# Patient Record
Sex: FEMALE | Race: WHITE
[De-identification: names, ages, dates, MRNs, and addresses within clinical notes are randomized per-mention and may not be internally consistent; named-entity substitution may affect disease eponyms.]

---

## 2018-06-10 ENCOUNTER — HOSPITAL ENCOUNTER (INPATIENT)
Dept: HOSPITAL 72 - EMR | Age: 83
LOS: 3 days | Discharge: HOME | DRG: 311 | End: 2018-06-13
Payer: COMMERCIAL

## 2018-06-10 VITALS — DIASTOLIC BLOOD PRESSURE: 73 MMHG | SYSTOLIC BLOOD PRESSURE: 150 MMHG

## 2018-06-10 VITALS — DIASTOLIC BLOOD PRESSURE: 69 MMHG | SYSTOLIC BLOOD PRESSURE: 141 MMHG

## 2018-06-10 VITALS — HEIGHT: 64 IN | BODY MASS INDEX: 24.41 KG/M2 | WEIGHT: 143 LBS

## 2018-06-10 VITALS — DIASTOLIC BLOOD PRESSURE: 59 MMHG | SYSTOLIC BLOOD PRESSURE: 122 MMHG

## 2018-06-10 VITALS — SYSTOLIC BLOOD PRESSURE: 152 MMHG | DIASTOLIC BLOOD PRESSURE: 65 MMHG

## 2018-06-10 VITALS — SYSTOLIC BLOOD PRESSURE: 129 MMHG | DIASTOLIC BLOOD PRESSURE: 98 MMHG

## 2018-06-10 VITALS — DIASTOLIC BLOOD PRESSURE: 61 MMHG | SYSTOLIC BLOOD PRESSURE: 126 MMHG

## 2018-06-10 DIAGNOSIS — F43.9: ICD-10-CM

## 2018-06-10 DIAGNOSIS — Z88.0: ICD-10-CM

## 2018-06-10 DIAGNOSIS — I20.9: Primary | ICD-10-CM

## 2018-06-10 DIAGNOSIS — E87.1: ICD-10-CM

## 2018-06-10 DIAGNOSIS — F41.9: ICD-10-CM

## 2018-06-10 DIAGNOSIS — F32.9: ICD-10-CM

## 2018-06-10 LAB
ADD MANUAL DIFF: NO
ALBUMIN SERPL-MCNC: 3.8 G/DL (ref 3.4–5)
ALBUMIN/GLOB SERPL: 1.2 {RATIO} (ref 1–2.7)
ALP SERPL-CCNC: 90 U/L (ref 46–116)
ALT SERPL-CCNC: 25 U/L (ref 12–78)
ANION GAP SERPL CALC-SCNC: 7 MMOL/L (ref 5–15)
APPEARANCE UR: CLEAR
APTT PPP: YELLOW S
AST SERPL-CCNC: 16 U/L (ref 15–37)
BASOPHILS NFR BLD AUTO: 0.8 % (ref 0–2)
BILIRUB SERPL-MCNC: 0.2 MG/DL (ref 0.2–1)
BUN SERPL-MCNC: 16 MG/DL (ref 7–18)
CALCIUM SERPL-MCNC: 9.3 MG/DL (ref 8.5–10.1)
CHLORIDE SERPL-SCNC: 96 MMOL/L (ref 98–107)
CK SERPL-CCNC: 178 U/L (ref 26–308)
CO2 SERPL-SCNC: 28 MMOL/L (ref 21–32)
CREAT SERPL-MCNC: 1.1 MG/DL (ref 0.55–1.3)
EOSINOPHIL NFR BLD AUTO: 1 % (ref 0–3)
ERYTHROCYTE [DISTWIDTH] IN BLOOD BY AUTOMATED COUNT: 11.3 % (ref 11.6–14.8)
GLOBULIN SER-MCNC: 3.1 G/DL
GLUCOSE UR STRIP-MCNC: NEGATIVE MG/DL
HCT VFR BLD CALC: 36 % (ref 37–47)
HGB BLD-MCNC: 12.4 G/DL (ref 12–16)
KETONES UR QL STRIP: NEGATIVE
LEUKOCYTE ESTERASE UR QL STRIP: NEGATIVE
LYMPHOCYTES NFR BLD AUTO: 23 % (ref 20–45)
MCV RBC AUTO: 94 FL (ref 80–99)
MONOCYTES NFR BLD AUTO: 6.4 % (ref 1–10)
NEUTROPHILS NFR BLD AUTO: 68.9 % (ref 45–75)
NITRITE UR QL STRIP: NEGATIVE
PH UR STRIP: 8 [PH] (ref 4.5–8)
PLATELET # BLD: 227 K/UL (ref 150–450)
POTASSIUM SERPL-SCNC: 4.1 MMOL/L (ref 3.5–5.1)
PROT UR QL STRIP: NEGATIVE
RBC # BLD AUTO: 3.84 M/UL (ref 4.2–5.4)
SODIUM SERPL-SCNC: 131 MMOL/L (ref 136–145)
SP GR UR STRIP: 1.01 (ref 1–1.03)
UROBILINOGEN UR-MCNC: NORMAL MG/DL (ref 0–1)
WBC # BLD AUTO: 10.8 K/UL (ref 4.8–10.8)

## 2018-06-10 PROCEDURE — 82550 ASSAY OF CK (CPK): CPT

## 2018-06-10 PROCEDURE — 71045 X-RAY EXAM CHEST 1 VIEW: CPT

## 2018-06-10 PROCEDURE — 85025 COMPLETE CBC W/AUTO DIFF WBC: CPT

## 2018-06-10 PROCEDURE — 99285 EMERGENCY DEPT VISIT HI MDM: CPT

## 2018-06-10 PROCEDURE — 81003 URINALYSIS AUTO W/O SCOPE: CPT

## 2018-06-10 PROCEDURE — 80053 COMPREHEN METABOLIC PANEL: CPT

## 2018-06-10 PROCEDURE — 36415 COLL VENOUS BLD VENIPUNCTURE: CPT

## 2018-06-10 PROCEDURE — 80329 ANALGESICS NON-OPIOID 1 OR 2: CPT

## 2018-06-10 PROCEDURE — 80307 DRUG TEST PRSMV CHEM ANLYZR: CPT

## 2018-06-10 PROCEDURE — 93005 ELECTROCARDIOGRAM TRACING: CPT

## 2018-06-10 PROCEDURE — 84484 ASSAY OF TROPONIN QUANT: CPT

## 2018-06-10 PROCEDURE — 78451 HT MUSCLE IMAGE SPECT SING: CPT

## 2018-06-10 PROCEDURE — 84443 ASSAY THYROID STIM HORMONE: CPT

## 2018-06-10 PROCEDURE — 85651 RBC SED RATE NONAUTOMATED: CPT

## 2018-06-10 RX ADMIN — HEPARIN SODIUM SCH UNITS: 5000 INJECTION INTRAVENOUS; SUBCUTANEOUS at 23:01

## 2018-06-10 NOTE — EMERGENCY ROOM REPORT
History of Present Illness


General


Chief Complaint:  General Complaint


Source:  Patient





Present Illness


HPI


She presents with increased anxiety, chest pressure, nausea and diarrhea.  She 

just started Lexapro 4 days ago.  She is concerned there might be a reaction to 

Lexapro.  No vomiting.  Has taken Peptobismol.  Loose stools which have been 

yellow in color.  She feels dizzy when standing or walking.  Denies 

palpitations.  





Increased anxiety due to senior care, ending relationship with  

boyfriend.  Denies SI or HI.  Feels she cannot control her anxiety with 

difficulty concentrating.





H/O nervous breakdown related to use of Valium requiring several days of 

hospitalization several years ago.  She states she had "water intoxication" a 

that time.  She cannot tolerate any benzodiazepines.





She was recently seen by her MD and told that a lab test suggested "inflammation

".  Denies recent cardiac work up.





Denies diabetes, smoking, HTN.





She does not know if thyroid normal.  





No rashes, URI, productive cough, wheezing.


Allergies:  


Coded Allergies:  


     PENICILLINS (Verified  Allergy, Unknown, 6/10/18)





Patient History


Past Medical History:  see triage record


Social History:  Denies: smoking, alcohol use, drug use


Social History Narrative


recently retired


Last Menstrual Period:  na


Reviewed Nursing Documentation:  PMH: Agreed; PSxH: Agreed





Nursing Documentation-PMH


Past Medical History:  No History, Except For


History Of Psychiatric Problem:  Yes - depression, anxiety





Review of Systems


All Other Systems:  negative except mentioned in HPI





Physical Exam





Vital Signs








  Date Time  Temp Pulse Resp B/P (MAP) Pulse Ox O2 Delivery O2 Flow Rate FiO2


 


6/10/18 18:14 97.9 83 20 144/86 94 Room Air  





 97.9       








Sp02 EP Interpretation:  reviewed, abnormal - interpreted as slightly low by me


General Appearance:  well appearing, GCS 15, mild distress


Head:  normocephalic, atraumatic


Eyes:  bilateral eye normal inspection, bilateral eye PERRL


ENT:  moist mucus membranes


Neck:  supple


Respiratory:  chest non-tender, lungs clear, normal breath sounds


Cardiovascular #1:  regular rate, rhythm


Cardiovascular #2:  2+ radial (R)


Gastrointestinal:  normal inspection, normal bowel sounds, non tender, no mass, 

non-distended


Musculoskeletal:  back normal, gait/station normal, normal range of motion


Neurologic:  alert, oriented x3, CNs III-XII nml as tested, motor strength/tone 

normal, DTRs symmetric, sensory intact, cerebellar normal, normal gait, speech 

normal


Psychiatric:  no suicidal/homicidal ideation, anxious


Skin:  normal inspection, warm/dry





Medical Decision Making


Diagnostic Impression:  


 Primary Impression:  


 Chest pain


 Qualified Codes:  R07.9 - Chest pain, unspecified


 Additional Impressions:  


 Adverse reaction to lexapro


 Anxiety and depression


ER Course


Patient presents with chest pressure, dizziness, anxiety nausea and diarrhea.  

DDX: anxiety, electrolyte imbalance, adverse reaction to Lexapro, thyroid 

abnormalities, ACS amongst others.  Evaluation with EKG, CXR, labs.  Treatment 

initially suggested with anxiolytic refused.  Zofran and pepcid ordered.  

Cardiac monitoring.





EKG no injury, CXR no infiltrates.  Labs significant for normal WBC, min low H/H

,  min low sodium, min elevated glucose, normal troponin and TSH.





Refused antihistamines - (I suggested Atarax).





Improved nausea with zofran and pepcid, but continued chest pressure.  Anxiety 

also improved.





Due to possibility of atypical ischemia, patient admitted for repeated troponin 

testing and cardiac monitoring.  Consideration for cardiology consultation as 

well as psychiatric consultation.





I suggested consideration of Paxil to patient.





Laboratory Tests








Test


  6/10/18


19:17


 


White Blood Count


  10.8 K/UL


(4.8-10.8)


 


Red Blood Count


  3.84 M/UL


(4.20-5.40)  L


 


Hemoglobin


  12.4 G/DL


(12.0-16.0)


 


Hematocrit


  36.0 %


(37.0-47.0)  L


 


Mean Corpuscular Volume 94 FL (80-99)  


 


Mean Corpuscular Hemoglobin


  32.4 PG


(27.0-31.0)  H


 


Mean Corpuscular Hemoglobin


Concent 34.6 G/DL


(32.0-36.0)


 


Red Cell Distribution Width


  11.3 %


(11.6-14.8)  L


 


Platelet Count


  227 K/UL


(150-450)


 


Mean Platelet Volume


  5.5 FL


(6.5-10.1)  L


 


Neutrophils (%) (Auto)


  68.9 %


(45.0-75.0)


 


Lymphocytes (%) (Auto)


  23.0 %


(20.0-45.0)


 


Monocytes (%) (Auto)


  6.4 %


(1.0-10.0)


 


Eosinophils (%) (Auto)


  1.0 %


(0.0-3.0)


 


Basophils (%) (Auto)


  0.8 %


(0.0-2.0)


 


Urine Color Yellow  


 


Urine Appearance Clear  


 


Urine pH 8.0 (4.5-8.0)  


 


Urine Specific Gravity


  1.010


(1.005-1.035)


 


Urine Protein


  Negative


(NEGATIVE)


 


Urine Glucose (UA)


  Negative


(NEGATIVE)


 


Urine Ketones


  Negative


(NEGATIVE)


 


Urine Occult Blood


  2+ (NEGATIVE)


H


 


Urine Nitrite


  Negative


(NEGATIVE)


 


Urine Bilirubin


  Negative


(NEGATIVE)


 


Urine Urobilinogen


  Normal MG/DL


(0.0-1.0)


 


Urine Leukocyte Esterase


  Negative


(NEGATIVE)


 


Urine RBC


  10-15 /HPF (0


- 2)  H


 


Urine WBC


  0-2 /HPF (0 -


2)


 


Urine Squamous Epithelial


Cells Few /LPF


(NONE/OCC)


 


Urine Bacteria


  Few /HPF


(NONE)


 


Sodium Level


  131 MMOL/L


(136-145)  L


 


Potassium Level


  4.1 MMOL/L


(3.5-5.1)


 


Chloride Level


  96 MMOL/L


()  L


 


Carbon Dioxide Level


  28 MMOL/L


(21-32)


 


Anion Gap


  7 mmol/L


(5-15)


 


Blood Urea Nitrogen


  16 mg/dL


(7-18)


 


Creatinine


  1.1 MG/DL


(0.55-1.30)


 


Estimate Glomerular


Filtration Rate  mL/min (>60)  


 


 


Glucose Level


  109 MG/DL


()  H


 


Calcium Level


  9.3 MG/DL


(8.5-10.1)


 


Total Bilirubin


  0.2 MG/DL


(0.2-1.0)


 


Aspartate Amino Transferase


(AST) 16 U/L (15-37)


 


 


Alanine Aminotransferase (ALT)


  25 U/L (12-78)


 


 


Alkaline Phosphatase


  90 U/L


()


 


Total Creatine Kinase


  178 U/L


()


 


Troponin I


  0.000 ng/mL


(0.000-0.056)


 


Total Protein


  6.9 G/DL


(6.4-8.2)


 


Albumin


  3.8 G/DL


(3.4-5.0)


 


Globulin 3.1 g/dL  


 


Albumin/Globulin Ratio 1.2 (1.0-2.7)  


 


Thyroid Stimulating Hormone


(TSH) 0.772 uiU/mL


(0.358-3.740)


 


Salicylates Level


  2.6 ug/mL


(2.8-20)  L


 


Urine Opiates Screen


  Negative


(NEGATIVE)


 


Acetaminophen Level


  < 2 MCG/ML


(10-30)  L


 


Urine Barbiturates Screen


  Negative


(NEGATIVE)


 


Phencyclidine (PCP) Screen


  Negative


(NEGATIVE)


 


Urine Amphetamines Screen


  Negative


(NEGATIVE)


 


Urine Benzodiazepines Screen


  Negative


(NEGATIVE)


 


Urine Cocaine Screen


  Negative


(NEGATIVE)


 


Urine Marijuana (THC) Screen


  Negative


(NEGATIVE)


 


Serum Alcohol < 3 mg/dL  








EKG Diagnostic Results


Rate:  normal


Rhythm:  NSR


ST Segments:  no acute changes





Rhythm Strip Diag. Results


EP Interpretation:  yes


Rhythm:  NSR, no PVC's, no ectopy





Chest X-Ray Diagnostic Results


Chest X-Ray Diagnostic Results :  


   Chest X-Ray Ordered:  Yes


   # of Views/Limited/Complete:  1 View


   Indication:  Chest Pain


   Interpretation:  no consolidation, no effusion, no pneumothorax


   Impression:  No acute disease


   Electronically Signed by:  Electronically signed by Kee Prince MD





Last Vital Signs








  Date Time  Temp Pulse Resp B/P (MAP) Pulse Ox O2 Delivery O2 Flow Rate FiO2


 


6/10/18 23:30 98.0 84 16 126/61 97 Room Air  





 98.0       








Disposition:  ADMITTED AS INPATIENT


Condition:  Serious











Kee Prince M.D. Talha 10, 2018 18:55

## 2018-06-11 VITALS — SYSTOLIC BLOOD PRESSURE: 176 MMHG | DIASTOLIC BLOOD PRESSURE: 74 MMHG

## 2018-06-11 VITALS — SYSTOLIC BLOOD PRESSURE: 126 MMHG | DIASTOLIC BLOOD PRESSURE: 83 MMHG

## 2018-06-11 VITALS — DIASTOLIC BLOOD PRESSURE: 70 MMHG | SYSTOLIC BLOOD PRESSURE: 123 MMHG

## 2018-06-11 VITALS — DIASTOLIC BLOOD PRESSURE: 68 MMHG | SYSTOLIC BLOOD PRESSURE: 125 MMHG

## 2018-06-11 VITALS — DIASTOLIC BLOOD PRESSURE: 78 MMHG | SYSTOLIC BLOOD PRESSURE: 124 MMHG

## 2018-06-11 VITALS — SYSTOLIC BLOOD PRESSURE: 148 MMHG | DIASTOLIC BLOOD PRESSURE: 75 MMHG

## 2018-06-11 RX ADMIN — HEPARIN SODIUM SCH UNITS: 5000 INJECTION INTRAVENOUS; SUBCUTANEOUS at 08:47

## 2018-06-11 RX ADMIN — HEPARIN SODIUM SCH UNITS: 5000 INJECTION INTRAVENOUS; SUBCUTANEOUS at 21:00

## 2018-06-11 NOTE — HISTORY AND PHYSICAL REPORT
DATE OF ADMISSION:  06/10/2018



CHIEF COMPLAINT:  Chest pain.



HISTORY OF PRESENT ILLNESS:  The patient is an 82-year-old female.  She has

a history of severe anxiety and presented with complaints of chest

pressure.  According the patient, 4 days ago she was started on Lexapro

for anxiety.  On the day of admission, she woke with anxiety, pressure in

her chest.  She presented to the emergency room.  On evaluation there,

initial EKG and enzymes were negative.  The patient continued to complain

of pressure.  She is now admitted for further evaluation and care.  She

does not have a family history of diabetes.  She is not a smoker.



PAST MEDICAL HISTORY:  As above.



PAST SURGICAL HISTORY:  Includes a history of  x2, hysterectomy,

and breast cyst removal.



CURRENT MEDICATIONS:  Reconciled and reviewed.



ALLERGIES:  Include penicillin.



FAMILY HISTORY:  Significant for diabetes.



SOCIAL HISTORY:  Negative for tobacco or drugs.  The patient drinks

socially.



REVIEW OF SYSTEMS:  GENERAL:  No fever or chills.   HEENT:  No headaches or

visual changes.  CARDIOPULMONARY:  Positive chest pain.  No shortness of

breath.  GASTROINTESTINAL:  No nausea or vomiting.   GENITOURINARY:  No

urgency or frequency.  MUSCULOSKELETAL:  No joint pain or swelling.

NEUROLOGIC:  No evidence of seizures.



PHYSICAL EXAMINATION:

VITAL SIGNS:  Temperature 98 degrees, pulse 76, respirations 17, blood

pressure 176/74.

GENERAL:  The patient is well-developed, no apparent distress.

HEART:  Regular rate and rhythm.

LUNGS:  Clear.

ABDOMEN:  Soft, nontender and nondistended.

EXTREMITIES:  Without clubbing cyanosis, or edema.



LABORATORY DATA:  White count 10, hemoglobin 12, hematocrit 36, and

platelets of 227.  Sodium 131, potassium 4.1.  Troponin was negative.  EKG

showed sinus rhythm.



ASSESSMENT:  This is a pleasant female, admitted with complaints of chest

pain suspect secondary to anxiety.



1. Chest pain.

2. Doubt cardiac etiology.

3. Severe anxiety.

4. Hyponatremia.

5. _____ possible hypertension.



PLAN:  Serial enzymes and EKGs.  Cardiology consultation.  Psychiatric

consultation for referring _____ to the psychiatrist.









  ______________________________________________

  Murtaza Hood M.D.





DR:  ANGY

D:  2018 09:13

T:  2018 16:28

JOB#:  [id number]

CC:

## 2018-06-11 NOTE — DIAGNOSTIC IMAGING REPORT
Indication: Chest pain

 

Technique: One view of the chest

 

Comparison: none

 

Findings: Lungs are somewhat hyperinflated. Lungs and pleural spaces are clear. The

heart size is normal.

 

Impression: No acute process

## 2018-06-11 NOTE — CONSULTATION
History of Present Illness


General


Date patient seen:  Jun 11, 2018


Chief Complaint:  General Complaint





Present Illness


HPI


82-year-old female with hx of anxiety d/o and depression who presented to er 

with c complaints of chest pressure. the pt was anxious and circumstantial. she 

is anxious everyday all day. the pt has low energy and has poor insight. she is 

in addition forgetful. the pt does not have a psychiatrist outside of the 

hospital


Allergies:  


Coded Allergies:  


     PENICILLINS (Verified  Allergy, Unknown, 6/10/18)





Medication History


Scheduled


Escitalopram Oxalate* (Lexapro*), 10 MG ORAL DAILY, (Reported)


Fish Oil (Fish Oil 1,000 mg Capsule), 1,000 MG ORAL DAILY, (Reported)





Miscellaneous Medications


Ferrous Sulfate (Iron), 325 MG PO, (Reported)


Riboflavin (Riboflavin), 0 PO, (Reported)





Patient History


Limited by:  medical condition


History Provided By:  Patient, Medical Record, PMD


Healthcare decision maker





Resuscitation status


Full Code


Advanced Directive on File


No





Past Medical/Surgical History


Past Medical/Surgical History:  


(1) Chest pain


(2) Anxiety and depression





Review of Systems


Psychiatric:  Reports: prior hx, anxiety, depressed feelings, emotional problems





Physical Exam


General Appearance:  no apparent distress, alert


Neurologic:  oriented x 3, responsive, depressed affect





Last 24 Hour Vital Signs








  Date Time  Temp Pulse Resp B/P (MAP) Pulse Ox O2 Delivery O2 Flow Rate FiO2


 


6/11/18 16:00 97.8 79 17 126/83 98 Room Air  





 97.8       


 


6/11/18 12:00 98.9 76 18 124/78 97 Room Air  





 98.9       


 


6/11/18 11:35  69      


 


6/11/18 08:00 97.9 76 17 176/74 96 Room Air  





 97.9       


 


6/11/18 07:41  66      


 


6/11/18 04:00 98.0 72 18 148/75 98 Room Air  





 98.0       


 


6/11/18 04:00  69      


 


6/11/18 02:15 98.0 72 16 125/68 97 Room Air  





 98.0       


 


6/11/18 01:30 98.0 72 16 125/68 97 Room Air  





 98.0       


 


6/10/18 23:30 98.0 84 16 126/61 97 Room Air  





 98.0       


 


6/10/18 22:30 98.0 78 18 122/59 99 Room Air  





 98.0       


 


6/10/18 21:30 97.8 84 18 150/73 98 Room Air  





 97.8       


 


6/10/18 20:30 98.0 79 18 141/69 99 Room Air  





 98.0       


 


6/10/18 19:30 97.9 80 18 152/65 98 Room Air  





 97.9       


 


6/10/18 18:24 97.9 78 22 129/98 98 Room Air  





 97.9       


 


6/10/18 18:14 97.9 83 20 144/86 94 Room Air  





 97.9       

















Intake and Output  


 


 6/10/18 6/11/18





 19:00 07:00


 


Intake Total  120 ml


 


Balance  120 ml


 


  


 


Intake Oral  120 ml


 


# Voids 1 











Laboratory Tests








Test


  6/10/18


19:17 6/11/18


09:30


 


White Blood Count


  10.8 K/UL


(4.8-10.8) 


 


 


Red Blood Count


  3.84 M/UL


(4.20-5.40)  L 


 


 


Hemoglobin


  12.4 G/DL


(12.0-16.0) 


 


 


Hematocrit


  36.0 %


(37.0-47.0)  L 


 


 


Mean Corpuscular Volume 94 FL (80-99)   


 


Mean Corpuscular Hemoglobin


  32.4 PG


(27.0-31.0)  H 


 


 


Mean Corpuscular Hemoglobin


Concent 34.6 G/DL


(32.0-36.0) 


 


 


Red Cell Distribution Width


  11.3 %


(11.6-14.8)  L 


 


 


Platelet Count


  227 K/UL


(150-450) 


 


 


Mean Platelet Volume


  5.5 FL


(6.5-10.1)  L 


 


 


Neutrophils (%) (Auto)


  68.9 %


(45.0-75.0) 


 


 


Lymphocytes (%) (Auto)


  23.0 %


(20.0-45.0) 


 


 


Monocytes (%) (Auto)


  6.4 %


(1.0-10.0) 


 


 


Eosinophils (%) (Auto)


  1.0 %


(0.0-3.0) 


 


 


Basophils (%) (Auto)


  0.8 %


(0.0-2.0) 


 


 


Urine Color Yellow   


 


Urine Appearance Clear   


 


Urine pH 8.0 (4.5-8.0)   


 


Urine Specific Gravity


  1.010


(1.005-1.035) 


 


 


Urine Protein


  Negative


(NEGATIVE) 


 


 


Urine Glucose (UA)


  Negative


(NEGATIVE) 


 


 


Urine Ketones


  Negative


(NEGATIVE) 


 


 


Urine Occult Blood


  2+ (NEGATIVE)


H 


 


 


Urine Nitrite


  Negative


(NEGATIVE) 


 


 


Urine Bilirubin


  Negative


(NEGATIVE) 


 


 


Urine Urobilinogen


  Normal MG/DL


(0.0-1.0) 


 


 


Urine Leukocyte Esterase


  Negative


(NEGATIVE) 


 


 


Urine RBC


  10-15 /HPF (0


- 2)  H 


 


 


Urine WBC


  0-2 /HPF (0 -


2) 


 


 


Urine Squamous Epithelial


Cells Few /LPF


(NONE/OCC) 


 


 


Urine Bacteria


  Few /HPF


(NONE) 


 


 


Sodium Level


  131 MMOL/L


(136-145)  L 


 


 


Potassium Level


  4.1 MMOL/L


(3.5-5.1) 


 


 


Chloride Level


  96 MMOL/L


()  L 


 


 


Carbon Dioxide Level


  28 MMOL/L


(21-32) 


 


 


Anion Gap


  7 mmol/L


(5-15) 


 


 


Blood Urea Nitrogen


  16 mg/dL


(7-18) 


 


 


Creatinine


  1.1 MG/DL


(0.55-1.30) 


 


 


Estimat Glomerular Filtration


Rate  mL/min (>60)  


  


 


 


Glucose Level


  109 MG/DL


()  H 


 


 


Calcium Level


  9.3 MG/DL


(8.5-10.1) 


 


 


Total Bilirubin


  0.2 MG/DL


(0.2-1.0) 


 


 


Aspartate Amino Transf


(AST/SGOT) 16 U/L (15-37)


  


 


 


Alanine Aminotransferase


(ALT/SGPT) 25 U/L (12-78)


  


 


 


Alkaline Phosphatase


  90 U/L


() 


 


 


Total Creatine Kinase


  178 U/L


() 


 


 


Troponin I


  0.000 ng/mL


(0.000-0.056) 0.009 ng/mL


(0.000-0.056)


 


Total Protein


  6.9 G/DL


(6.4-8.2) 


 


 


Albumin


  3.8 G/DL


(3.4-5.0) 


 


 


Globulin 3.1 g/dL   


 


Albumin/Globulin Ratio 1.2 (1.0-2.7)   


 


Thyroid Stimulating Hormone


(TSH) 0.772 uiU/mL


(0.358-3.740) 


 


 


Salicylates Level


  2.6 ug/mL


(2.8-20)  L 


 


 


Urine Opiates Screen


  Negative


(NEGATIVE) 


 


 


Acetaminophen Level


  < 2 MCG/ML


(10-30)  L 


 


 


Urine Barbiturates Screen


  Negative


(NEGATIVE) 


 


 


Phencyclidine (PCP) Screen


  Negative


(NEGATIVE) 


 


 


Urine Amphetamines Screen


  Negative


(NEGATIVE) 


 


 


Urine Benzodiazepines Screen


  Negative


(NEGATIVE) 


 


 


Urine Cocaine Screen


  Negative


(NEGATIVE) 


 


 


Urine Marijuana (THC) Screen


  Negative


(NEGATIVE) 


 


 


Serum Alcohol < 3 mg/dL   


 


Erythrocyte Sedimentation Rate


  


  3 MM/HR (0-30)


 








Height (Feet):  5


Height (Inches):  4.00


Weight (Pounds):  132


Medications





Current Medications








 Medications


  (Trade)  Dose


 Ordered  Sig/Marilyn


 Route


 PRN Reason  Start Time


 Stop Time Status Last Admin


Dose Admin


 


 Acetaminophen


  (Tylenol)  650 mg  Q4H  PRN


 ORAL


 Mild Pain/Temp > 100.5  6/10/18 23:00


 7/10/18 22:59  6/11/18 04:33


 


 


 Al Hydroxide/Mg


 Hydroxide


  (Mylanta)  30 ml  Q6H  PRN


 ORAL


 Abdominal cramps  6/10/18 23:00


 7/10/18 22:59   


 


 


 Heparin Sodium


  (Porcine)


  (Heparin 5000


 units/ml)  5,000 units  EVERY 12  HOURS


 SUBQ


   6/10/18 23:01


 7/10/18 23:00   


 


 


 Lorazepam


  (Ativan)  1 mg  Q6H  PRN


 ORAL


 For Anxiety  6/10/18 23:00


 6/17/18 22:59   


 


 


 Magnesium


 Hydroxide


  (Mom)  30 ml  DAILYPRN  PRN


 ORAL


 Constipation  6/10/18 23:00


 7/10/18 22:59   


 


 


 Ondansetron HCl


  (Zofran)  4 mg  Q6H  PRN


 IVP


 Nausea & Vomiting  6/10/18 23:00


 7/10/18 22:59   


 











Assessment/Plan


Assessment/Plan


anxiety d/o


depression





-lexapro


-ativan prn











Masood Whitt M.D. Jun 11, 2018 17:03

## 2018-06-11 NOTE — CARDIOLOGY REPORT
--------------- APPROVED REPORT --------------





EKG Measurement

Heart [id number]

VA [id number]

[id number]

[id number]

[id number]





Normal sinus rhythm

Normal ECG

## 2018-06-12 VITALS — SYSTOLIC BLOOD PRESSURE: 131 MMHG | DIASTOLIC BLOOD PRESSURE: 72 MMHG

## 2018-06-12 VITALS — SYSTOLIC BLOOD PRESSURE: 115 MMHG | DIASTOLIC BLOOD PRESSURE: 64 MMHG

## 2018-06-12 VITALS — SYSTOLIC BLOOD PRESSURE: 114 MMHG | DIASTOLIC BLOOD PRESSURE: 62 MMHG

## 2018-06-12 VITALS — SYSTOLIC BLOOD PRESSURE: 132 MMHG | DIASTOLIC BLOOD PRESSURE: 77 MMHG

## 2018-06-12 VITALS — SYSTOLIC BLOOD PRESSURE: 113 MMHG | DIASTOLIC BLOOD PRESSURE: 44 MMHG

## 2018-06-12 VITALS — DIASTOLIC BLOOD PRESSURE: 60 MMHG | SYSTOLIC BLOOD PRESSURE: 123 MMHG

## 2018-06-12 RX ADMIN — HEPARIN SODIUM SCH UNITS: 5000 INJECTION INTRAVENOUS; SUBCUTANEOUS at 20:55

## 2018-06-12 RX ADMIN — HEPARIN SODIUM SCH UNITS: 5000 INJECTION INTRAVENOUS; SUBCUTANEOUS at 08:59

## 2018-06-12 NOTE — CONSULTATION
DATE OF CONSULTATION:  06/11/2018



CARDIOLOGY CONSULTATION



CONSULTING PHYSICIAN:  Kee Salazar M.D.



REQUESTING PHYSICIAN:  Murtaza Hood M.D.



REASON FOR CONSULTATION:  Chest pressure.



HISTORY OF PRESENT ILLNESS:  This is an 82-year-old female with no prior

history of cardiovascular disease.  She has a longstanding history of

anxiety and notes that she had some increasing episodes of anxiety over

the past several days.  She woke up yesterday evening with anxiety and

subsequently pressure in her chest.  She came into the emergency room as a

result due to recommendations from her therapist.



In the emergency room, her initial EKG revealed sinus rhythm with no

abnormality and troponin values x2 have been negative.  The patient still

has some chest pressure and anxiety, but no relation to any physical

activity or walking.  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

been walking around this medical locke extensively today noting no change

in her symptoms with activity.



PAST MEDICAL HISTORY:  Includes hysterectomy and benign breast biopsy.



ALLERGIES:  Penicillin.



FAMILY HISTORY:  Notable for diabetes.



MEDICATIONS:  Reviewed and reconciled.



SOCIAL HISTORY:  Negative for smoking, alcohol, or substance abuse.



REVIEW OF SYSTEMS:  A 10-point review of systems performed all systems

negative other than noted above.



PHYSICAL EXAMINATION:

GENERAL:  Awake, alert, cooperative, and calm with evaluation and in no

acute distress.

VITAL SIGNS:  Today blood pressure 124/78, pulse 76, respirations 18, and

afebrile.  It should be noted on initial presentation to the emergency

room, her blood pressure was up to 176 systolic.

HEENT:  Normocephalic and atraumatic.  Conjunctivae pink.  Sclerae are

anicteric.  Oropharynx clear.

NECK:  Supple.  Jugular venous pressure normal.

LUNGS:  Clear.  No bruits.

CARDIAC:  Regular rhythm and rate.  Normal S1 and S2 with no murmur, rub,

or gallop.

ABDOMEN:  Soft and nontender.

EXTREMITIES:  Good pulses.  No edema.

NEUROLOGIC:  Nonfocal.  There is no tremor and she is somewhat anxious, but

has good insight and judgment.



LABORATORY DATA:  Laboratories are reviewed.



IMPRESSION:

1. Anginal syndrome, possibly precipitated by acute anxiety.

2. Anxiety and depression.



PLAN:

1. Angiolytics, anti-platelet therapies.

2. Continue cardiac monitoring.

3. Exercise stress test for assessment of coronary flow reserve.

4. Check lipid panel.









  ______________________________________________

  Kee Salazar M.D.





DR:  OSEAS

D:  06/12/2018 03:54

T:  06/12/2018 17:12

JOB#:  [id number]

CC:

## 2018-06-12 NOTE — GENERAL PROGRESS NOTE
Assessment/Plan


Status:  stable


Assessment/Plan


Anxiety d/o


MDD





dc lexapro


dc ativan


give one dose of seroquel and reevaluate





Subjective


Date patient seen:  Jun 12, 2018


Neurologic/Psychiatric:  Reports: anxiety, emotional problems


Allergies:  


Coded Allergies:  


     PENICILLINS (Verified  Allergy, Unknown, 6/10/18)


Subjective


The pt stated that although she has been on Lexapro at home. The pt stated that 

no meds work for her, she agreed to seroquel one time.





Objective





Last 24 Hour Vital Signs








  Date Time  Temp Pulse Resp B/P (MAP) Pulse Ox O2 Delivery O2 Flow Rate FiO2


 


6/12/18 08:00 96.1 61 18 131/72 100 Room Air  





 96.1       


 


6/12/18 08:00  65      


 


6/12/18 04:00 98.1 58 20 114/62 99 Room Air  





 98.1       


 


6/12/18 04:00  64      


 


6/12/18 00:00 97.7 67 20 115/64 98 Room Air  





 97.7       


 


6/12/18 00:00  64      


 


6/11/18 20:00  67      


 


6/11/18 20:00 98.1 67 19 123/70 98 Room Air  





 98.1       


 


6/11/18 16:00 97.8 79 17 126/83 98 Room Air  





 97.8       


 


6/11/18 15:10  80      


 


6/11/18 12:00 98.9 76 18 124/78 97 Room Air  





 98.9       

















Intake and Output  


 


 6/11/18 6/12/18





 19:00 07:00


 


Intake Total 2700 ml 


 


Balance 2700 ml 


 


  


 


Other 2700 ml 


 


# Voids 5 4








Laboratory Tests


6/12/18 06:35: Troponin I 0.000


Height (Feet):  5


Height (Inches):  4.00


Weight (Pounds):  132


General Appearance:  alert


Neurologic:  oriented x 3, responsive, depressed affect











Masood Whitt M.D. Jun 12, 2018 12:04

## 2018-06-12 NOTE — GENERAL PROGRESS NOTE
Assessment/Plan


Problem List:  


(1) Chest pain


ICD Codes:  R07.9 - Chest pain, unspecified


SNOMED:  98364299, [id number]


Qualifiers:  


   Qualified Codes:  R07.9 - Chest pain, unspecified


(2) Anxiety and depression


ICD Codes:  F41.9 - Anxiety disorder, unspecified; F32.9 - Major depressive 

disorder, single episode, unspecified


SNOMED:  [id number], [id number]


Status:  stable


Assessment/Plan


cont current rx


psych follow up


stress test





Subjective


ROS Limited/Unobtainable:  No


Constitutional:  Reports: malaise, weakness


HEENT:  Reports: no symptoms


Cardiovascular:  Reports: no symptoms


Respiratory:  Reports: no symptoms


Gastrointestinal/Abdominal:  Reports: no symptoms


Genitourinary:  Reports: no symptoms


Neurologic/Psychiatric:  Reports: anxiety


Endocrine:  Reports: no symptoms


Hematologic/Lymphatic:  Reports: no symptoms


Allergies:  


Coded Allergies:  


     PENICILLINS (Verified  Allergy, Unknown, 6/10/18)


All Systems:  reviewed and negative except above


Subjective


states she had anxiety attack while sleeping. no chest pressure


refusing ativan and lexapro- states she didnt tolerate in the past





Objective





Last 24 Hour Vital Signs








  Date Time  Temp Pulse Resp B/P (MAP) Pulse Ox O2 Delivery O2 Flow Rate FiO2


 


6/12/18 08:00 96.1 61 18 131/72 100 Room Air  





 96.1       


 


6/12/18 08:00  65      


 


6/12/18 04:00 98.1 58 20 114/62 99 Room Air  





 98.1       


 


6/12/18 04:00  64      


 


6/12/18 00:00 97.7 67 20 115/64 98 Room Air  





 97.7       


 


6/12/18 00:00  64      


 


6/11/18 20:00  67      


 


6/11/18 20:00 98.1 67 19 123/70 98 Room Air  





 98.1       


 


6/11/18 16:00 97.8 79 17 126/83 98 Room Air  





 97.8       


 


6/11/18 15:10  80      


 


6/11/18 12:00 98.9 76 18 124/78 97 Room Air  





 98.9       


 


6/11/18 11:35  69      

















Intake and Output  


 


 6/11/18 6/12/18





 19:00 07:00


 


Intake Total 2700 ml 


 


Balance 2700 ml 


 


  


 


Other 2700 ml 


 


# Voids 5 4








Laboratory Tests


6/12/18 06:35: Troponin I 0.000


Height (Feet):  5


Height (Inches):  4.00


Weight (Pounds):  132


General Appearance:  WD/WN, alert


Neck:  supple


Cardiovascular:  regular rhythm


Respiratory/Chest:  chest wall non-tender, lungs clear, normal breath sounds, 

no respiratory distress


Abdomen:  normal bowel sounds, non tender, soft, no organomegaly


Edema:  no edema noted Arm (L), no edema noted Arm (R), no edema noted Leg (L), 

no edema noted Leg (R), no edema noted Pedal (L), no edema noted Pedal (R), no 

edema noted Generalized


Neurologic:  CNs II-XII grossly normal, no motor/sensory deficits, abnormal gait











Murtaza Hood MD Jun 12, 2018 10:33

## 2018-06-13 VITALS — SYSTOLIC BLOOD PRESSURE: 133 MMHG | DIASTOLIC BLOOD PRESSURE: 70 MMHG

## 2018-06-13 VITALS — SYSTOLIC BLOOD PRESSURE: 134 MMHG | DIASTOLIC BLOOD PRESSURE: 61 MMHG

## 2018-06-13 VITALS — DIASTOLIC BLOOD PRESSURE: 54 MMHG | SYSTOLIC BLOOD PRESSURE: 114 MMHG

## 2018-06-13 VITALS — SYSTOLIC BLOOD PRESSURE: 103 MMHG | DIASTOLIC BLOOD PRESSURE: 56 MMHG

## 2018-06-13 RX ADMIN — HEPARIN SODIUM SCH UNITS: 5000 INJECTION INTRAVENOUS; SUBCUTANEOUS at 09:00

## 2018-06-13 NOTE — GENERAL PROGRESS NOTE
Assessment/Plan


Problem List:  


(1) Chest pain


ICD Codes:  R07.9 - Chest pain, unspecified


SNOMED:  44039688, 398210041


Qualifiers:  


   Qualified Codes:  R07.9 - Chest pain, unspecified


(2) Anxiety and depression


ICD Codes:  F41.9 - Anxiety disorder, unspecified; F32.9 - Major depressive 

disorder, single episode, unspecified


SNOMED:  [id number], [id number]


Status:  stable


Assessment/Plan


cont current rx


psych follow up


dc planning





Subjective


ROS Limited/Unobtainable:  No


Constitutional:  Reports: malaise, weakness


HEENT:  Reports: no symptoms


Cardiovascular:  Reports: no symptoms


Respiratory:  Reports: no symptoms


Gastrointestinal/Abdominal:  Reports: no symptoms


Genitourinary:  Reports: no symptoms


Neurologic/Psychiatric:  Reports: anxiety


Endocrine:  Reports: no symptoms


Hematologic/Lymphatic:  Reports: no symptoms


Allergies:  


Coded Allergies:  


     PENICILLINS (Verified  Allergy, Unknown, 6/10/18)


All Systems:  reviewed and negative except above


Subjective


was unable to do stress test due to anxiety. declined dc until she has had a 

chance to speak with psychiatrist





Objective





Last 24 Hour Vital Signs








  Date Time  Temp Pulse Resp B/P (MAP) Pulse Ox O2 Delivery O2 Flow Rate FiO2


 


6/13/18 08:00 97.5 75 20 134/61 95 Room Air  





 97.5       


 


6/13/18 08:00  91      


 


6/13/18 04:00 97.7 66 18 103/56 93 Room Air  





 97.7       


 


6/13/18 04:00  67      


 


6/13/18 00:00 98.1 69 20 114/64 93 Room Air  





 98.1       


 


6/13/18 00:00  77      


 


6/12/18 20:00 97.9 74 18 132/77 97 Room Air  





 97.9       


 


6/12/18 20:00  68      


 


6/12/18 16:00 97.0 79 18 123/60 99 Room Air  





 97.0       


 


6/12/18 16:00  83      


 


6/12/18 12:00 97.9 76 18 113/44 97 Room Air  





 97.9       


 


6/12/18 12:00  77      

















Intake and Output  


 


 6/12/18 6/13/18





 19:00 07:00


 


Intake Total 770 ml 800 ml


 


Balance 770 ml 800 ml


 


  


 


Intake Oral 770 ml 800 ml


 


# Voids 4 








Height (Feet):  5


Height (Inches):  4.00


Weight (Pounds):  143


General Appearance:  WD/WN


Neck:  supple


Cardiovascular:  regular rhythm


Respiratory/Chest:  lungs clear


Abdomen:  normal bowel sounds, non tender, soft, no organomegaly


Edema:  no edema noted Arm (L), no edema noted Arm (R), no edema noted Leg (L), 

no edema noted Leg (R), no edema noted Pedal (L), no edema noted Pedal (R), no 

edema noted Generalized











Murtaza Hood MD Jun 13, 2018 09:41

## 2018-06-13 NOTE — PROGRESS NOTE
DATE:  06/12/2018



CARDIOLOGY PROGRESS NOTE



SUBJECTIVE:  The patient is ambulatory.  She has started Seroquel today.

She felt dizzy and weak.  She was unable to consider a treadmill as a

result.  She has no chest pain.  Troponin levels negative x3.



OBJECTIVE:

VITAL SIGNS:  Blood pressure 132/77, pulse 74, respiratory rate 18, and

afebrile.

LUNGS:  Clear.

CARDIAC:  Regular.  Normal S1, S2.

ABDOMEN:  Soft.

EXTREMITIES:  No edema.



IMPRESSION:

1. Anxiety.

2. Episode of chest pain.

3. Low likelihood for cardiac ischemia.



PLAN:

1. Aspirin prophylaxis.

2. Defer stress test to outpatient setting when the patient is more

stable from a psychiatric standpoint and can discontinue telemetry.









  ______________________________________________

  Kee Salazar M.D.





DR:  GAGAN

D:  06/13/2018 01:32

T:  06/13/2018 04:56

JOB#:  [id number]

CC:

## 2018-06-13 NOTE — PROGRESS NOTE
DATE:  06/13/2018



CARDIOLOGY PROGRESS NOTE



SUBJECTIVE:  No chest pain.  No shortness of breath.  Remains anxious.

Felt dizzy after psychiatric medications were initiated and _____ to do

treadmill yesterday.



OBJECTIVE:

VITAL SIGNS:  Blood pressure 134/61, pulse 75, respirations 20, and

afebrile.

LUNGS:  Clear.

CARDIAC:  Regular.

NORMAL:  S1, S2.  No murmur, rub, or gallop.

ABDOMEN:  Soft and nontender.

EXTREMITIES:  No edema.



IMPRESSION:

1. Low clinical suspicion for flow-limiting coronary artery disease.

2. Anginal episode may have been in fact precipitated by stress.



PLAN:

1. Recommend continued anti-platelet therapy.

2. The patient is stable for discharge from cardiovascular standpoint.

3. The patient will be recommended for a stress test at my office.









  ______________________________________________

  Kee Salazar M.D.





DR:  BERTA

D:  06/13/2018 18:10

T:  06/13/2018 21:50

JOB#:  [id number]

CC:

## 2018-06-13 NOTE — DIAGNOSTIC IMAGING REPORT
Indication: Chest pain

 

Technique: Resting images obtained with IV administration 10.5 mCi  99m technetium

Myoview. No stress images obtained; stress test canceled by supervising cardiologist

 

Comparison: none

 

Findings: Resting images demonstrate normal perfusion. Normal cardiac chamber size

 

Impression: Normal resting images, no evidence of infarct.

 

Unable to assess for presence of ischemia in the absence of post stress imaging

## 2018-06-14 NOTE — DISCHARGE SUMMARY
Discharge Summary


Discharge Summary


_


DATE OF ADMISSION: 06/10/2018





DATE OF DISCHARGE 06/13/2018 





REASON FOR ADMISSION: 


82 years old female with  past medical history of severe anxiety presented to 

emergency department complaining of chest pressure.  


According to the patient, .  she was started on Lexapro for anxiety few days 

ago. 


On the day of admission she woke up with anxiety and pressure in  the chest.  


She presented to emergency room for evaluation for evaluation .


Troponin was negative.  EKG revealed no acute ischemic changes and demonstrated 

normal sinus rhythm. Patient   denied smoking .


She admitted for further evaluation and management with diagnosis of f  chest 

pain, severe anxiety


 


CONSULTANTS:


cardiologist   


psychiatrist 


 


[facility] COURSE: 


Patient was admitted to telemetry floor.  


Serial troponin were negative.  EKG revealed no acute ischemic changes.  

Patient was ruled out for acute MI.  


According to cardiologist,  patient likely had an anginal syndrome , probably 

precipitated by acute anxiety.  


Stress test was ordered, however  patient was unable to complete  stress test 

due to anxiety.  





According to cardiologist ,patient  had low clinical suspicion for flow-

limiting coronary artery disease. 


Cardiologist recommended to  continue antiplatelet therapy  and have stress 

test at the cardiologist office.  


Cardiologist cleared patient for discharge.


Nitroglycerin provided as needed for symptomatic relief.





Psychiatry seen and evaluated patient, and  diagnosed patient with anxiety 

disorder and major depressive disorder. 


Psychiatrist optimized psychiatric medication regimen.  


Patient was stable for discharge with outpatient follow-up with  cardiologist 

for stress test





FINAL DIAGNOSES: 


Anginal syndrome probably precipitated by anxiety and stress


Major depressive disorder


Anxiety disorder





DISCHARGE MEDICATIONS:


See Medication Reconciliation list.





DISCHARGE INSTRUCTIONS:


Patient was discharged home,  follow-up with the primary care provider in one 

week





I have been assigned to dictate discharge summary for this account. I was not 

involved in the patient's management.











Pauly Alfred NP Jun 14, 2018 11:23

## 2020-10-16 ENCOUNTER — HOSPITAL ENCOUNTER (OUTPATIENT)
Dept: HOSPITAL 72 - VAS | Age: 85
Discharge: HOME | End: 2020-10-16
Payer: COMMERCIAL

## 2020-10-16 DIAGNOSIS — M79.604: ICD-10-CM

## 2020-10-16 DIAGNOSIS — M79.605: Primary | ICD-10-CM

## 2020-10-16 DIAGNOSIS — I73.9: ICD-10-CM

## 2020-10-16 PROCEDURE — 93925 LOWER EXTREMITY STUDY: CPT

## 2020-10-16 NOTE — DIAGNOSTIC IMAGING REPORT
Indication: Bilateral leg pain

 

Technique: Grayscale and duplex images of the bilateral lower extremity arteries

 

Comparison: none

 

Findings: On the right, biphasic waveforms with sharp systolic peaks are seen at all

levels including the ankle. No focal flow velocity elevations are demonstrated.

 

On the left, biphasic or triphasic waveforms with sharp systolic peaks are seen at

all levels, including the ankle vessels. No focal flow velocity elevations are

demonstrated.

 

Impression: No evidence of significant lower extremity arterial insufficiency

## 2020-11-03 ENCOUNTER — HOSPITAL ENCOUNTER (EMERGENCY)
Dept: HOSPITAL 72 - EMR | Age: 85
Discharge: HOME | End: 2020-11-03
Payer: COMMERCIAL

## 2020-11-03 VITALS — BODY MASS INDEX: 22.53 KG/M2 | HEIGHT: 64 IN | WEIGHT: 132 LBS

## 2020-11-03 VITALS — SYSTOLIC BLOOD PRESSURE: 132 MMHG | DIASTOLIC BLOOD PRESSURE: 85 MMHG

## 2020-11-03 VITALS — SYSTOLIC BLOOD PRESSURE: 148 MMHG | DIASTOLIC BLOOD PRESSURE: 91 MMHG

## 2020-11-03 DIAGNOSIS — R07.9: ICD-10-CM

## 2020-11-03 DIAGNOSIS — I10: ICD-10-CM

## 2020-11-03 DIAGNOSIS — Z88.0: ICD-10-CM

## 2020-11-03 DIAGNOSIS — R50.9: Primary | ICD-10-CM

## 2020-11-03 LAB
ADD MANUAL DIFF: NO
ALBUMIN SERPL-MCNC: 3.7 G/DL (ref 3.4–5)
ALBUMIN/GLOB SERPL: 1.1 {RATIO} (ref 1–2.7)
ALP SERPL-CCNC: 98 U/L (ref 46–116)
ALT SERPL-CCNC: 21 U/L (ref 12–78)
ANION GAP SERPL CALC-SCNC: 5 MMOL/L (ref 5–15)
APPEARANCE UR: CLEAR
APTT PPP: (no result) S
AST SERPL-CCNC: 19 U/L (ref 15–37)
BASOPHILS NFR BLD AUTO: 1.2 % (ref 0–2)
BILIRUB SERPL-MCNC: 0.2 MG/DL (ref 0.2–1)
BUN SERPL-MCNC: 23 MG/DL (ref 7–18)
CALCIUM SERPL-MCNC: 9.2 MG/DL (ref 8.5–10.1)
CHLORIDE SERPL-SCNC: 98 MMOL/L (ref 98–107)
CK MB SERPL-MCNC: 3.4 NG/ML (ref 0–3.6)
CK SERPL-CCNC: 125 U/L (ref 26–308)
CO2 SERPL-SCNC: 30 MMOL/L (ref 21–32)
CREAT SERPL-MCNC: 1.2 MG/DL (ref 0.55–1.3)
EOSINOPHIL NFR BLD AUTO: 1.7 % (ref 0–3)
ERYTHROCYTE [DISTWIDTH] IN BLOOD BY AUTOMATED COUNT: 13.1 % (ref 11.6–14.8)
FERRITIN SERPL-MCNC: 68 NG/ML (ref 8–388)
GLOBULIN SER-MCNC: 3.3 G/DL
GLUCOSE UR STRIP-MCNC: NEGATIVE MG/DL
HCT VFR BLD CALC: 39.3 % (ref 37–47)
HGB BLD-MCNC: 13.1 G/DL (ref 12–16)
KETONES UR QL STRIP: NEGATIVE
LDH SERPL L TO P-CCNC: 175 U/L (ref 81–234)
LEUKOCYTE ESTERASE UR QL STRIP: NEGATIVE
LYMPHOCYTES NFR BLD AUTO: 29.5 % (ref 20–45)
MCV RBC AUTO: 97 FL (ref 80–99)
MONOCYTES NFR BLD AUTO: 6.4 % (ref 1–10)
NEUTROPHILS NFR BLD AUTO: 61.2 % (ref 45–75)
NITRITE UR QL STRIP: NEGATIVE
PH UR STRIP: 7 [PH] (ref 4.5–8)
PLATELET # BLD: 232 K/UL (ref 150–450)
POTASSIUM SERPL-SCNC: 4 MMOL/L (ref 3.5–5.1)
PROT UR QL STRIP: NEGATIVE
RBC # BLD AUTO: 4.04 M/UL (ref 4.2–5.4)
SODIUM SERPL-SCNC: 133 MMOL/L (ref 136–145)
SP GR UR STRIP: 1 (ref 1–1.03)
UROBILINOGEN UR-MCNC: NORMAL MG/DL (ref 0–1)
WBC # BLD AUTO: 8.9 K/UL (ref 4.8–10.8)

## 2020-11-03 PROCEDURE — 36415 COLL VENOUS BLD VENIPUNCTURE: CPT

## 2020-11-03 PROCEDURE — 93005 ELECTROCARDIOGRAM TRACING: CPT

## 2020-11-03 PROCEDURE — 82550 ASSAY OF CK (CPK): CPT

## 2020-11-03 PROCEDURE — 85025 COMPLETE CBC W/AUTO DIFF WBC: CPT

## 2020-11-03 PROCEDURE — 87040 BLOOD CULTURE FOR BACTERIA: CPT

## 2020-11-03 PROCEDURE — 83690 ASSAY OF LIPASE: CPT

## 2020-11-03 PROCEDURE — 71045 X-RAY EXAM CHEST 1 VIEW: CPT

## 2020-11-03 PROCEDURE — 82728 ASSAY OF FERRITIN: CPT

## 2020-11-03 PROCEDURE — 82553 CREATINE MB FRACTION: CPT

## 2020-11-03 PROCEDURE — 80053 COMPREHEN METABOLIC PANEL: CPT

## 2020-11-03 PROCEDURE — 99283 EMERGENCY DEPT VISIT LOW MDM: CPT

## 2020-11-03 PROCEDURE — 86140 C-REACTIVE PROTEIN: CPT

## 2020-11-03 PROCEDURE — 81003 URINALYSIS AUTO W/O SCOPE: CPT

## 2020-11-03 PROCEDURE — 83605 ASSAY OF LACTIC ACID: CPT

## 2020-11-03 PROCEDURE — 84484 ASSAY OF TROPONIN QUANT: CPT

## 2020-11-03 PROCEDURE — 83615 LACTATE (LD) (LDH) ENZYME: CPT

## 2020-11-03 PROCEDURE — 85379 FIBRIN DEGRADATION QUANT: CPT

## 2020-11-03 NOTE — EMERGENCY ROOM REPORT
History of Present Illness


General


Chief Complaint:  General Complaint


Source:  Patient





Present Illness


HPI


The patient states that she was doing meditation today and felt "hot."  She 

states that she did took her temperature and it was 99.  She states that this is

high for her.  She did contact her primary care physician Dr. Metzger who instructed

her to get COVID-19 tested.  She denies pain.  She denies chest pain or 

shortness of breath.  She denies cough or congestion.  She denies chills.  She 

denies abdominal pain.  She denies dysuria or hematuria.  She denies headache or

neck pain.  She has no other complaints.


Allergies:  


Coded Allergies:  


     PENICILLINS (Verified  Allergy, Unknown, 6/10/18)





COVID-19 Screening


Contact w/high risk pt:  No


Recent Travel to affected area:  No


Experienced COVID-19 symptoms?:  No


COVID-19 Testing performed PTA:  No





Patient History


Past Medical History:  see triage record, HTN, psych hx


Past Surgical History:  


Social History:  Denies: smoking, alcohol use, drug use


Reviewed Nursing Documentation:  PMH: Agreed; PSxH: Agreed





Nursing Documentation-PMH


Past Medical History:  No Stated History


Hx Cardiac Problems:  No


Hx Cancer:  No


Hx Gastrointestinal Problems:  No


Hx Neurological Problems:  No





Review of Systems


All Other Systems:  negative except mentioned in HPI





Physical Exam





Vital Signs








  Date Time  Temp Pulse Resp B/P (MAP) Pulse Ox O2 Delivery O2 Flow Rate FiO2


 


11/3/20 14:52 98.6 100 18 148/91 (110) 94   








Sp02 EP Interpretation:  reviewed, normal


General Appearance:  no apparent distress, alert, GCS 15, non-toxic


Head:  normocephalic, atraumatic


ENT:  hearing grossly normal, normal pharynx, no angioedema, normal voice


Neck:  full range of motion, supple/symm/no masses


Respiratory:  chest non-tender, lungs clear, normal breath sounds, no 

respiratory distress, no retraction, no accessory muscle use, speaking full 

sentences


Cardiovascular #1:  regular rate, rhythm, no edema


Gastrointestinal:  normal bowel sounds, non tender, soft, non-distended, no 

guarding, no rebound


Rectal:  deferred


Musculoskeletal:  back normal, normal range of motion, gait/station normal, non-

tender


Neurologic:  alert, motor strength/tone normal, oriented x3, sensory intact, 

responsive, speech normal


Psychiatric:  judgement/insight normal, memory normal, mood/affect normal, no 

suicidal/homicidal ideation





Medical Decision Making


Diagnostic Impression:  


   Primary Impression:  


   COVID-19 ruled out by laboratory testing


ER Course


Overall, this patient is well-appearing and nontoxic.  She is very concerned 

about COVID-19 as she presents during the COVID-19 pandemic.  Given the 

patient's age, although, she looks great and in the emergency department has no 

evidence of a fever, I felt that basic laboratory tests should be obtained to 

include CBC, CMP, urinalysis and a COVID-19 test.  These labs were all 

unremarkable.  Overall, the patient is well-appearing without evidence of an 

emergency medical condition.  At this time, patient is stable to follow-up with 

her primary care physician.  The patient is given close return precautions and 

follow-up instructions.





This patient was evaluated in the context of the global COVID-19 pandemic, which

necessitated consideration that the patient might be at risk for infection with 

the SARS-COVID-2 virus that causes COVID-19.  Institutional protocols and 

algorithms that pertain to the evaluation of patients at risk for COVID-19 and 

the state of rapid change based on information released by multiple regulatory b

odies including the CDC and federal and state organizations.  These policies and

algorithms were followed during the patient's care in the ED.





Laboratory Tests








Test


 11/3/20


15:45 11/3/20


16:15


 


White Blood Count


 8.9 K/UL


(4.8-10.8) 





 


Red Blood Count


 4.04 M/UL


(4.20-5.40)  L 





 


Hemoglobin


 13.1 G/DL


(12.0-16.0) 





 


Hematocrit


 39.3 %


(37.0-47.0) 





 


Mean Corpuscular Volume 97 FL (80-99)   


 


Mean Corpuscular Hemoglobin


 32.4 PG


(27.0-31.0)  H 





 


Mean Corpuscular Hemoglobin


Concent 33.3 G/DL


(32.0-36.0) 





 


Red Cell Distribution Width


 13.1 %


(11.6-14.8) 





 


Platelet Count


 232 K/UL


(150-450) 





 


Mean Platelet Volume


 6.4 FL


(6.5-10.1)  L 





 


Neutrophils (%) (Auto)


 61.2 %


(45.0-75.0) 





 


Lymphocytes (%) (Auto)


 29.5 %


(20.0-45.0) 





 


Monocytes (%) (Auto)


 6.4 %


(1.0-10.0) 





 


Eosinophils (%) (Auto)


 1.7 %


(0.0-3.0) 





 


Basophils (%) (Auto)


 1.2 %


(0.0-2.0) 





 


D-Dimer


 0.31 mg/L FEU


(0.00-0.49) 





 


Sodium Level


 133 MMOL/L


(136-145)  L 





 


Potassium Level


 4.0 MMOL/L


(3.5-5.1) 





 


Chloride Level


 98 MMOL/L


() 





 


Carbon Dioxide Level


 30 MMOL/L


(21-32) 





 


Anion Gap


 5 mmol/L


(5-15) 





 


Blood Urea Nitrogen


 23 mg/dL


(7-18)  H 





 


Creatinine


 1.2 MG/DL


(0.55-1.30) 





 


Estimated Glomerular


Filtration Rate 42.7 mL/min


(>60) 





 


Glucose Level


 123 MG/DL


()  H 





 


Lactic Acid Level


 0.70 mmol/L


(0.4-2.0) 





 


Calcium Level


 9.2 MG/DL


(8.5-10.1) 





 


Ferritin


 68 NG/ML


(8-388) 





 


Total Bilirubin


 0.2 MG/DL


(0.2-1.0) 





 


Aspartate Amino Transferase


(AST) 19 U/L (15-37)


 





 


Alanine Aminotransferase (ALT)


 21 U/L (12-78)


 





 


Alkaline Phosphatase


 98 U/L


() 





 


Lactate Dehydrogenase


 175 U/L


() 





 


Total Creatine Kinase


 125 U/L


() 





 


Creatine Kinase MB


 3.4 NG/ML


(0.0-3.6) 





 


Creatine Kinase MB Relative


Index 2.7  


 





 


Troponin I


 0.002 ng/mL


(0.000-0.056) 





 


C-Reactive Protein,


Quantitative < 0.4 mg/dL


(0.00-0.90) 





 


Total Protein


 7.0 G/DL


(6.4-8.2) 





 


Albumin


 3.7 G/DL


(3.4-5.0) 





 


Globulin 3.3 g/dL   


 


Albumin/Globulin Ratio 1.1 (1.0-2.7)   


 


Lipase


 119 U/L


() 





 


Urine Color  Pending  


 


Urine Appearance  Pending  


 


Urine pH  Pending  


 


Urine Specific Gravity  Pending  


 


Urine Protein  Pending  


 


Urine Glucose (UA)  Pending  


 


Urine Ketones  Pending  


 


Urine Blood  Pending  


 


Urine Nitrite  Pending  


 


Urine Bilirubin  Pending  


 


Urine Urobilinogen  Pending  


 


Urine Leukocyte Esterase  Pending  








Microbiology








 Date/Time


Source Procedure


Growth Status





 


 11/3/20 15:10


Nasopharynx SARS-CoV-2 RdRp Gene Assay - Final Complete








Laboratory Tests








Test


 11/3/20


15:45


 


White Blood Count


 8.9 K/UL


(4.8-10.8)


 


Red Blood Count


 4.04 M/UL


(4.20-5.40)  L


 


Hemoglobin


 13.1 G/DL


(12.0-16.0)


 


Hematocrit


 39.3 %


(37.0-47.0)


 


Mean Corpuscular Volume 97 FL (80-99)  


 


Mean Corpuscular Hemoglobin


 32.4 PG


(27.0-31.0)  H


 


Mean Corpuscular Hemoglobin


Concent 33.3 G/DL


(32.0-36.0)


 


Red Cell Distribution Width


 13.1 %


(11.6-14.8)


 


Platelet Count


 232 K/UL


(150-450)


 


Mean Platelet Volume


 6.4 FL


(6.5-10.1)  L


 


Neutrophils (%) (Auto)


 61.2 %


(45.0-75.0)


 


Lymphocytes (%) (Auto)


 29.5 %


(20.0-45.0)


 


Monocytes (%) (Auto)


 6.4 %


(1.0-10.0)


 


Eosinophils (%) (Auto)


 1.7 %


(0.0-3.0)


 


Basophils (%) (Auto)


 1.2 %


(0.0-2.0)


 


D-Dimer Pending  


 


Sodium Level


 133 MMOL/L


(136-145)  L


 


Potassium Level


 4.0 MMOL/L


(3.5-5.1)


 


Chloride Level


 98 MMOL/L


()


 


Carbon Dioxide Level


 30 MMOL/L


(21-32)


 


Anion Gap


 5 mmol/L


(5-15)


 


Blood Urea Nitrogen


 23 mg/dL


(7-18)  H


 


Creatinine


 1.2 MG/DL


(0.55-1.30)


 


Estimated Glomerular


Filtration Rate 42.7 mL/min


(>60)


 


Glucose Level


 123 MG/DL


()  H


 


Lactic Acid Level Pending  


 


Calcium Level


 9.2 MG/DL


(8.5-10.1)


 


Ferritin Pending  


 


Total Bilirubin Pending  


 


Aspartate Amino Transferase


(AST) Pending  





 


Alanine Aminotransferase (ALT) Pending  


 


Alkaline Phosphatase Pending  


 


Lactate Dehydrogenase Pending  


 


Total Creatine Kinase Pending  


 


Creatine Kinase MB Pending  


 


Troponin I Pending  


 


C-Reactive Protein,


Quantitative Pending  





 


Total Protein Pending  


 


Albumin Pending  


 


Globulin Pending  


 


Lipase Pending  








Microbiology








 Date/Time


Source Procedure


Growth Status





 


 11/3/20 15:10


Nasopharynx SARS-CoV-2 RdRp Gene Assay - Final Complete








EKG Diagnostic Results


Rate:  normal


Rhythm:  NSR


ST Segments:  no acute changes





Rhythm Strip Diag. Results


EP Interpretation:  yes


Rate:  70's


Rhythm:  NSR, no PVC's, no ectopy





Chest X-Ray Diagnostic Results


Chest X-Ray Diagnostic Results :  


   Chest X-Ray Ordered:  Yes


   # of Views/Limited/Complete:  1 View


   Indication:  Other


   EP Interpretation:  Yes


   Interpretation:  no consolidation, no effusion, no pneumothorax, no acute 

cardiopulmonary disease


   Impression:  No acute disease


   Electronically Signed by:  Liz Lundy DO





Last Vital Signs








  Date Time  Temp Pulse Resp B/P (MAP) Pulse Ox O2 Delivery O2 Flow Rate FiO2


 


11/3/20 15:06  100 18     


 


11/3/20 15:06 98.6   148/91 94   








Status:  improved


Disposition:  HOME, SELF-CARE


Condition:  Improved


Referrals:  


NON PHYSICIAN (PCP)











Liz Lundy DO             Nov 3, 2020 16:35

## 2020-11-03 NOTE — NUR
ED Nurse Note:

2nd blood culture peformed using 22g needle on the right hand. Applied clean, 
dry dressing.

## 2020-11-03 NOTE — NUR
ED Nurse Note:



pt walked in from home after her PCP Dr. Rj Metzger advised her to come in to 
get a COVID test. per pt, she has had fevers of 99 degrees at home, nothing 
greater than 100 but that her PCP advsie her to come in and get tested.pt 
denies any cough, SOB, N/V/D, loss of taste or smell at this time. noted to be 
afebrile in triage

## 2020-11-04 NOTE — DIAGNOSTIC IMAGING REPORT
Indication: Chest pain

 

Technique: One view of the chest

 

Comparison: 6/11/2018

 

Findings: Lungs and pleural spaces are clear. Heart size is normal.

 

Impression: No acute process

## 2020-11-04 NOTE — CARDIOLOGY REPORT
--------------- APPROVED REPORT --------------





EKG Measurement

Heart [id number]

[id number]

[id number]

[id number]

[id number]



<Conclusion>

Normal sinus rhythm

Normal ECG

## 2021-01-21 ENCOUNTER — HOSPITAL ENCOUNTER (OUTPATIENT)
Dept: HOSPITAL 72 - RAD | Age: 86
Discharge: HOME | End: 2021-01-21
Payer: COMMERCIAL

## 2021-01-21 DIAGNOSIS — M25.521: Primary | ICD-10-CM

## 2021-01-21 NOTE — DIAGNOSTIC IMAGING REPORT
EXAM: X-RAY XRAY Elbow 2v R

 

CLINICAL HISTORY: Elbow pain. 

 

COMPARISON:  None

 

FINDINGS:  Total of 2 views of the right elbow were obtained. 

 

Alignment is anatomic. There is no fracture, bony lesions or erosions. Joint spaces

are unremarkable. Surrounding soft tissue is normal.

 

IMPRESSION:

 

NO ACUTE BONY ABNORMALITY SEEN ON THESE 2 PROJECTIONS.